# Patient Record
Sex: FEMALE | Race: BLACK OR AFRICAN AMERICAN | NOT HISPANIC OR LATINO | Employment: OTHER | URBAN - METROPOLITAN AREA
[De-identification: names, ages, dates, MRNs, and addresses within clinical notes are randomized per-mention and may not be internally consistent; named-entity substitution may affect disease eponyms.]

---

## 2020-02-20 PROBLEM — R87.810 ASCUS WITH POSITIVE HIGH RISK HPV CERVICAL: Status: ACTIVE | Noted: 2020-02-20

## 2020-02-20 PROBLEM — D36.9 DERMOID CYST: Status: ACTIVE | Noted: 2020-02-20

## 2020-02-20 PROBLEM — R87.610 ASCUS WITH POSITIVE HIGH RISK HPV CERVICAL: Status: ACTIVE | Noted: 2020-02-20

## 2022-03-07 PROBLEM — F16.11: Chronic | Status: ACTIVE | Noted: 2022-03-07

## 2022-03-07 PROBLEM — F13.11 BENZODIAZEPINE ABUSE IN REMISSION: Status: ACTIVE | Noted: 2022-03-07

## 2022-03-07 PROBLEM — F11.21 OPIOID USE DISORDER, SEVERE, IN SUSTAINED REMISSION: Chronic | Status: ACTIVE | Noted: 2022-03-07

## 2022-03-07 PROBLEM — Z87.898 HISTORY OF SUBSTANCE USE DISORDER: Status: ACTIVE | Noted: 2022-03-07

## 2022-03-07 PROBLEM — F11.21 OPIOID USE DISORDER, SEVERE, IN SUSTAINED REMISSION: Status: ACTIVE | Noted: 2022-03-07

## 2022-03-07 PROBLEM — F33.0 MDD (MAJOR DEPRESSIVE DISORDER), RECURRENT EPISODE, MILD: Chronic | Status: ACTIVE | Noted: 2022-03-07

## 2022-03-07 PROBLEM — F51.05 INSOMNIA DUE TO MENTAL DISORDER: Status: ACTIVE | Noted: 2022-03-07

## 2022-03-07 PROBLEM — R76.8 HSV-2 SEROPOSITIVE: Status: ACTIVE | Noted: 2019-03-25

## 2022-03-07 PROBLEM — F43.10 PTSD (POST-TRAUMATIC STRESS DISORDER): Chronic | Status: ACTIVE | Noted: 2022-03-07

## 2022-03-07 PROBLEM — J45.990 EXERCISE-INDUCED ASTHMA: Status: ACTIVE | Noted: 2019-03-25

## 2022-03-07 PROBLEM — B00.9 HERPES SIMPLEX TYPE 1 ANTIBODY POSITIVE: Status: ACTIVE | Noted: 2019-03-25

## 2022-03-07 PROBLEM — F33.0 MDD (MAJOR DEPRESSIVE DISORDER), RECURRENT EPISODE, MILD: Status: ACTIVE | Noted: 2022-03-07

## 2022-03-07 PROBLEM — F13.11 BENZODIAZEPINE ABUSE IN REMISSION: Chronic | Status: ACTIVE | Noted: 2022-03-07

## 2022-03-07 PROBLEM — F63.3 HAIR PULLING: Chronic | Status: ACTIVE | Noted: 2022-03-07

## 2022-03-07 PROBLEM — F63.3 HAIR PULLING: Status: ACTIVE | Noted: 2022-03-07

## 2022-03-07 PROBLEM — Z87.898 HISTORY OF SUBSTANCE USE DISORDER: Chronic | Status: ACTIVE | Noted: 2022-03-07

## 2022-03-07 PROBLEM — Z87.59 HISTORY OF ECTOPIC PREGNANCY: Status: ACTIVE | Noted: 2019-03-25

## 2022-03-07 PROBLEM — F43.10 PTSD (POST-TRAUMATIC STRESS DISORDER): Status: ACTIVE | Noted: 2022-03-07

## 2022-03-07 PROBLEM — F34.89 OTHER SPECIFIED PERSISTENT MOOD DISORDERS: Status: ACTIVE | Noted: 2022-03-07

## 2022-03-07 PROBLEM — O21.9 NAUSEA AND VOMITING DURING PREGNANCY: Status: ACTIVE | Noted: 2019-03-25

## 2022-03-07 PROBLEM — R45.4 IRRITABILITY AND ANGER: Chronic | Status: ACTIVE | Noted: 2022-03-07

## 2022-03-07 PROBLEM — F16.11: Status: ACTIVE | Noted: 2022-03-07

## 2022-03-07 PROBLEM — F12.20 CANNABIS USE DISORDER, SEVERE, DEPENDENCE: Chronic | Status: ACTIVE | Noted: 2022-03-07

## 2022-03-07 PROBLEM — F12.20 CANNABIS USE DISORDER, SEVERE, DEPENDENCE: Status: ACTIVE | Noted: 2022-03-07

## 2022-03-07 PROBLEM — F34.89 OTHER SPECIFIED PERSISTENT MOOD DISORDERS: Chronic | Status: ACTIVE | Noted: 2022-03-07

## 2022-03-07 PROBLEM — F51.05 INSOMNIA DUE TO MENTAL DISORDER: Chronic | Status: ACTIVE | Noted: 2022-03-07

## 2022-03-07 PROBLEM — R45.4 IRRITABILITY AND ANGER: Status: ACTIVE | Noted: 2022-03-07

## 2022-04-11 PROBLEM — F31.81 BIPOLAR 2 DISORDER: Chronic | Status: ACTIVE | Noted: 2022-03-07

## 2022-04-11 PROBLEM — F31.81 BIPOLAR 2 DISORDER: Status: ACTIVE | Noted: 2022-03-07

## 2022-06-23 PROBLEM — M25.642 STIFFNESS OF JOINTS OF BOTH HANDS: Status: ACTIVE | Noted: 2022-06-23

## 2022-06-23 PROBLEM — M25.641 STIFFNESS OF JOINTS OF BOTH HANDS: Status: ACTIVE | Noted: 2022-06-23

## 2022-06-23 PROBLEM — R68.2 DRY MOUTH: Status: ACTIVE | Noted: 2022-06-23

## 2022-06-23 PROBLEM — M89.8X9 BONE PAIN: Status: ACTIVE | Noted: 2022-06-23

## 2022-06-23 PROBLEM — M54.2 CHRONIC NECK PAIN: Status: ACTIVE | Noted: 2022-06-23

## 2022-06-23 PROBLEM — G89.29 CHRONIC NECK PAIN: Status: ACTIVE | Noted: 2022-06-23

## 2022-10-19 ENCOUNTER — SOCIAL WORK (OUTPATIENT)
Dept: ADMINISTRATIVE | Facility: OTHER | Age: 27
End: 2022-10-19

## 2022-10-19 NOTE — PROGRESS NOTES
SW met with pt regarding initial OB assessment. Pt stated this is her 5th pregnancy/1-miscarriage. Pt stated lives with her /children-10,3,2 and able to perform ADL's independently. Pt stated does not work. Pt stated support system is her /[employer]. Pt stated has medicaid([employer]). Pt stated does not have WIC. Pt stated is going to breastfeed. SW provide pt with information on other community resources.SW faxed and scanned pt's notification of pregnancy into epic.  No other needs identified at this time.    Elham Green,MSW  Pager#[phone]

## 2022-11-01 ENCOUNTER — PATIENT MESSAGE (OUTPATIENT)
Dept: ADMINISTRATIVE | Facility: OTHER | Age: 27
End: 2022-11-01

## 2022-11-08 ENCOUNTER — PATIENT MESSAGE (OUTPATIENT)
Dept: ADMINISTRATIVE | Facility: OTHER | Age: 27
End: 2022-11-08

## 2022-11-17 PROBLEM — O26.892 PREGNANCY HEADACHE IN SECOND TRIMESTER: Status: ACTIVE | Noted: 2022-11-17

## 2022-11-17 PROBLEM — I49.9 IRREGULAR HEARTBEAT: Status: ACTIVE | Noted: 2022-11-17

## 2022-11-17 PROBLEM — R51.9 PREGNANCY HEADACHE IN SECOND TRIMESTER: Status: ACTIVE | Noted: 2022-11-17

## 2022-11-17 PROBLEM — M31.30: Status: ACTIVE | Noted: 2022-11-17

## 2022-11-28 PROBLEM — R94.31 ABNORMAL EKG: Status: ACTIVE | Noted: 2022-11-28

## 2022-12-01 PROBLEM — E55.9 VITAMIN D DEFICIENCY: Status: ACTIVE | Noted: 2022-12-01

## 2022-12-01 PROBLEM — Z34.90 PREGNANCY: Status: ACTIVE | Noted: 2022-12-01

## 2022-12-23 PROBLEM — O09.92 HIGH-RISK PREGNANCY IN SECOND TRIMESTER: Status: ACTIVE | Noted: 2022-12-01

## 2022-12-23 PROBLEM — M54.2 CHRONIC NECK PAIN: Status: RESOLVED | Noted: 2022-06-23 | Resolved: 2022-12-23

## 2022-12-23 PROBLEM — F63.3 HAIR PULLING: Chronic | Status: RESOLVED | Noted: 2022-03-07 | Resolved: 2022-12-23

## 2022-12-23 PROBLEM — M89.8X9 BONE PAIN: Status: RESOLVED | Noted: 2022-06-23 | Resolved: 2022-12-23

## 2022-12-23 PROBLEM — M25.641 STIFFNESS OF JOINTS OF BOTH HANDS: Status: RESOLVED | Noted: 2022-06-23 | Resolved: 2022-12-23

## 2022-12-23 PROBLEM — G89.29 CHRONIC NECK PAIN: Status: RESOLVED | Noted: 2022-06-23 | Resolved: 2022-12-23

## 2022-12-23 PROBLEM — M25.642 STIFFNESS OF JOINTS OF BOTH HANDS: Status: RESOLVED | Noted: 2022-06-23 | Resolved: 2022-12-23

## 2022-12-23 PROBLEM — R68.2 DRY MOUTH: Status: RESOLVED | Noted: 2022-06-23 | Resolved: 2022-12-23

## 2022-12-23 PROBLEM — B00.9 HERPES SIMPLEX TYPE 1 ANTIBODY POSITIVE: Status: RESOLVED | Noted: 2019-03-25 | Resolved: 2022-12-23

## 2022-12-23 PROBLEM — Z87.59 HISTORY OF ECTOPIC PREGNANCY: Status: RESOLVED | Noted: 2019-03-25 | Resolved: 2022-12-23

## 2023-01-31 ENCOUNTER — PATIENT MESSAGE (OUTPATIENT)
Dept: ADMINISTRATIVE | Facility: OTHER | Age: 28
End: 2023-01-31

## 2023-06-13 ENCOUNTER — PATIENT MESSAGE (OUTPATIENT)
Dept: RESEARCH | Facility: HOSPITAL | Age: 28
End: 2023-06-13

## 2023-06-20 ENCOUNTER — PATIENT MESSAGE (OUTPATIENT)
Dept: RESEARCH | Facility: HOSPITAL | Age: 28
End: 2023-06-20

## 2023-06-27 ENCOUNTER — PATIENT MESSAGE (OUTPATIENT)
Dept: RESEARCH | Facility: HOSPITAL | Age: 28
End: 2023-06-27

## 2023-07-05 ENCOUNTER — PATIENT MESSAGE (OUTPATIENT)
Dept: RESEARCH | Facility: HOSPITAL | Age: 28
End: 2023-07-05

## 2023-07-11 ENCOUNTER — PATIENT MESSAGE (OUTPATIENT)
Dept: RESEARCH | Facility: HOSPITAL | Age: 28
End: 2023-07-11